# Patient Record
(demographics unavailable — no encounter records)

---

## 2025-04-15 NOTE — PHYSICAL EXAM

## 2025-04-18 NOTE — RISK ASSESSMENT
[No Increased risk of SCA or SCD] : No Increased risk of SCA or SCD    [[id number]] : 2 [Have you ever fainted, passed out or had an unexplained seizure suddenly and without warning, especially during exercise or in response] : Have you ever fainted, passed out or had an unexplained seizure suddenly and without warning, especially during exercise or in response to sudden loud noises such as doorbells, alarm clocks and ringing telephones? No [Have you ever had exercise-related chest pain or shortness of breath?] : Have you ever had exercise-related chest pain or shortness of breath? No [Has anyone in your immediate family (parents, grandparents, siblings) or other more distant relatives (aunts, uncles, cousins)  of heart] : Has anyone in your immediate family (parents, grandparents, siblings) or other more distant relatives (aunts, uncles, cousins)  of heart problems or had an unexpected sudden death before age 50 (This would include unexpected drownings, unexplained car accidents in which the relative was driving or sudden infant death syndrome.)? No [Are you related to anyone with hypertrophic cardiomyopathy or hypertrophic obstructive cardiomyopathy, Marfan syndrome, arrhythmogenic] : Are you related to anyone with hypertrophic cardiomyopathy or hypertrophic obstructive cardiomyopathy, Marfan syndrome, arrhythmogenic right ventricular cardiomyopathy, long QT syndrome, short QT syndrome, Brugada syndrome or catecholaminergic polymorphic ventricular tachycardia, or anyone younger than 50 years with a pacemaker or implantable defibrillator? No

## 2025-04-18 NOTE — DISCUSSION/SUMMARY
[Normal Development] : development  [No Elimination Concerns] : elimination [No Skin Concerns] : skin [Normal Sleep Pattern] : sleep [None] : no medical problems [Physical Growth and Development] : physical growth and development [Social and Academic Competence] : social and academic competence [Emotional Well-Being] : emotional well-being [Risk Reduction] : risk reduction [Violence and Injury Prevention] : violence and injury prevention [Patient] : patient [Mother] : mother [Full Activity without restrictions including Physical Education & Athletics] : Full Activity without restrictions including Physical Education & Athletics [] : The components of the vaccine(s) to be administered today are listed in the plan of care. The disease(s) for which the vaccine(s) are intended to prevent and the risks have been discussed with the caretaker.  The risks are also included in the appropriate vaccination information statements which have been provided to the patient's caregiver.  The caregiver has given consent to vaccinate. [de-identified] : Accelerated weight velocity  [de-identified] : Decrease chips [FreeTextEntry1] : Rocio is a 11yo girl with history of >95%ile for weight that presents for WCC. Since last visit, patient has continued to show accelerated weight gain, having gained about ~30lbs since last visit and has gained about 4 inches in height. In addition, mother expresses concern that patient has had near daily episodes of emotional outbursts at home. Patient had seen a therapist in the past, but patient did not feel comfortable opening up to them about her emotions so has not seen a therapist some time. Had long discussion with Rocio about importance of mental/emotional health in order to promote her overall health, however Rocio is not comfortable at this time speaking to a mental health professional and also feels she does not have time to see anyone   - HPV #2 given to patient today  - Given continued high weight velocity, will resend fasting labs: lipids, CBC, A1c, thyroid studies, AST/ALT  - CLARISSA-7 score 9 and PHQ9 score 2  - Dental list given  - Discussed seeing weight management clinic, mother undecided  - Concern for emotional lability, but patient is unwilling to speak with a mental health specialist. Will message office mental health specialist to have them aware of family in case Rocio changes her mind  RTC in 2-3 months for follow up for weight check and emotional check in

## 2025-04-18 NOTE — RISK ASSESSMENT
[No Increased risk of SCA or SCD] : No Increased risk of SCA or SCD    [[id number]] : 2 [Have you ever fainted, passed out or had an unexplained seizure suddenly and without warning, especially during exercise or in response] : Have you ever fainted, passed out or had an unexplained seizure suddenly and without warning, especially during exercise or in response to sudden loud noises such as doorbells, alarm clocks and ringing telephones? No [Have you ever had exercise-related chest pain or shortness of breath?] : Have you ever had exercise-related chest pain or shortness of breath? No [Has anyone in your immediate family (parents, grandparents, siblings) or other more distant relatives (aunts, uncles, cousins)  of heart] : Has anyone in your immediate family (parents, grandparents, siblings) or other more distant relatives (aunts, uncles, cousins)  of heart problems or had an unexpected sudden death before age 50 (This would include unexpected drownings, unexplained car accidents in which the relative was driving or sudden infant death syndrome.)? No [Are you related to anyone with hypertrophic cardiomyopathy or hypertrophic obstructive cardiomyopathy, Marfan syndrome, arrhythmogenic] : Are you related to anyone with hypertrophic cardiomyopathy or hypertrophic obstructive cardiomyopathy, Marfan syndrome, arrhythmogenic right ventricular cardiomyopathy, long QT syndrome, short QT syndrome, Brugada syndrome or catecholaminergic polymorphic ventricular tachycardia, or anyone younger than 50 years with a pacemaker or implantable defibrillator? No

## 2025-04-18 NOTE — HISTORY OF PRESENT ILLNESS
[Mother] : mother [Tap water] : Primary Fluoride Source: Tap water [Up to date] : Up to date [Normal] : normal [LMP: _____] : LMP: [unfilled] [Days of Bleeding: _____] : Days of bleeding: [unfilled] [Age of Menarche: ____] : Age of Menarche: [unfilled] [Menstrual products used per day: _____] : Menstrual products used per day: [unfilled] [Eats meals with family] : eats meals with family [Has family members/adults to turn to for help] : has family members/adults to turn to for help [Is permitted and is able to make independent decisions] : Is permitted and is able to make independent decisions [Grade: ____] : Grade: [unfilled] [Normal Performance] : normal performance [Normal Behavior/Attention] : normal behavior/attention [Normal Homework] : normal homework [Eats regular meals including adequate fruits and vegetables] : eats regular meals including adequate fruits and vegetables [Drinks non-sweetened liquids] : drinks non-sweetened liquids  [Calcium source] : calcium source [Has concerns about body or appearance] : has concerns about body or appearance [Has friends] : has friends [At least 1 hour of physical activity a day] : at least 1 hour of physical activity a day [Screen time (except homework) less than 2 hours a day] : screen time (except homework) less than 2 hours a day [Has interests/participates in community activities/volunteers] : has interests/participates in community activities/volunteers. [Uses safety belts/safety equipment] : uses safety belts/safety equipment  [Has peer relationships free of violence] : has peer relationships free of violence [No] : Patient has not had sexual intercourse [HIV Screening Declined] : HIV Screening Declined [Displays self-confidence] : displays self-confidence [Gets depressed, anxious, or irritable/has mood swings] : gets depressed, anxious, or irritable/has mood swings [NO] : No [Irregular menses] : no irregular menses [Heavy Bleeding] : no heavy bleeding [Painful Cramps] : no painful cramps [Hirsutism] : no hirsutism [Acne] : no acne [Tampon Use] : no tampon use [Sleep Concerns] : no sleep concerns [Uses electronic nicotine delivery system] : does not use electronic nicotine delivery system [Exposure to electronic nicotine delivery system] : no exposure to electronic nicotine delivery system [Uses tobacco] : does not use tobacco [Exposure to tobacco] : no exposure to tobacco [Uses drugs] : does not use drugs  [Exposure to drugs] : no exposure to drugs [Drinks alcohol] : does not drink alcohol [Exposure to alcohol] : no exposure to alcohol [Impaired/distracted driving] : no impaired/distracted driving [Has ways to cope with stress] : does not have ways to cope with stress [Has problems with sleep] : does not have problems with sleep [Has thought about hurting self or considered suicide] : has not thought about hurting self or considered suicide [FreeTextEntry7] : Has been doing well in school  [de-identified] : Mother concerned about weight velocity. Mother also concerned about emotional lability  [de-identified] : Has not seen a dentist in over a year, needs dental list  [de-identified] : Sleeps from 10:15PM - 5:30AM [de-identified] : Doing well in school, interested in musical theater. Taking accelerated 9th grade level math classes  [de-identified] : Patient eats 3 meals a day, eats fruit and chips for snacks. Some concerns for body issues.  [de-identified] : Has many extracurricular activities including musical theater  [de-identified] : Interested in both men and women [de-identified] : Patient has episodes of emotional lability where she will become very angry with parents over misconstrued comments. Patient feels very remorseful following emotional outbursts. Had previously seen a therapist before, but no longer seeing now. Does not have great methods to cope with stress (states she does more work when feeling stressed).

## 2025-04-18 NOTE — HISTORY OF PRESENT ILLNESS
[Mother] : mother [Tap water] : Primary Fluoride Source: Tap water [Up to date] : Up to date [Normal] : normal [LMP: _____] : LMP: [unfilled] [Days of Bleeding: _____] : Days of bleeding: [unfilled] [Age of Menarche: ____] : Age of Menarche: [unfilled] [Menstrual products used per day: _____] : Menstrual products used per day: [unfilled] [Eats meals with family] : eats meals with family [Has family members/adults to turn to for help] : has family members/adults to turn to for help [Is permitted and is able to make independent decisions] : Is permitted and is able to make independent decisions [Grade: ____] : Grade: [unfilled] [Normal Performance] : normal performance [Normal Behavior/Attention] : normal behavior/attention [Normal Homework] : normal homework [Eats regular meals including adequate fruits and vegetables] : eats regular meals including adequate fruits and vegetables [Drinks non-sweetened liquids] : drinks non-sweetened liquids  [Calcium source] : calcium source [Has concerns about body or appearance] : has concerns about body or appearance [Has friends] : has friends [At least 1 hour of physical activity a day] : at least 1 hour of physical activity a day [Screen time (except homework) less than 2 hours a day] : screen time (except homework) less than 2 hours a day [Has interests/participates in community activities/volunteers] : has interests/participates in community activities/volunteers. [Uses safety belts/safety equipment] : uses safety belts/safety equipment  [Has peer relationships free of violence] : has peer relationships free of violence [No] : Patient has not had sexual intercourse [HIV Screening Declined] : HIV Screening Declined [Displays self-confidence] : displays self-confidence [Gets depressed, anxious, or irritable/has mood swings] : gets depressed, anxious, or irritable/has mood swings [NO] : No [Irregular menses] : no irregular menses [Heavy Bleeding] : no heavy bleeding [Painful Cramps] : no painful cramps [Hirsutism] : no hirsutism [Acne] : no acne [Tampon Use] : no tampon use [Sleep Concerns] : no sleep concerns [Uses electronic nicotine delivery system] : does not use electronic nicotine delivery system [Exposure to electronic nicotine delivery system] : no exposure to electronic nicotine delivery system [Uses tobacco] : does not use tobacco [Exposure to tobacco] : no exposure to tobacco [Uses drugs] : does not use drugs  [Exposure to drugs] : no exposure to drugs [Drinks alcohol] : does not drink alcohol [Exposure to alcohol] : no exposure to alcohol [Impaired/distracted driving] : no impaired/distracted driving [Has ways to cope with stress] : does not have ways to cope with stress [Has problems with sleep] : does not have problems with sleep [Has thought about hurting self or considered suicide] : has not thought about hurting self or considered suicide [FreeTextEntry7] : Has been doing well in school  [de-identified] : Mother concerned about weight velocity. Mother also concerned about emotional lability  [de-identified] : Has not seen a dentist in over a year, needs dental list  [de-identified] : Sleeps from 10:15PM - 5:30AM [de-identified] : Doing well in school, interested in musical theater. Taking accelerated 9th grade level math classes  [de-identified] : Patient eats 3 meals a day, eats fruit and chips for snacks. Some concerns for body issues.  [de-identified] : Has many extracurricular activities including musical theater  [de-identified] : Interested in both men and women [de-identified] : Patient has episodes of emotional lability where she will become very angry with parents over misconstrued comments. Patient feels very remorseful following emotional outbursts. Had previously seen a therapist before, but no longer seeing now. Does not have great methods to cope with stress (states she does more work when feeling stressed).

## 2025-04-18 NOTE — DISCUSSION/SUMMARY
[Normal Development] : development  [No Elimination Concerns] : elimination [No Skin Concerns] : skin [Normal Sleep Pattern] : sleep [None] : no medical problems [Physical Growth and Development] : physical growth and development [Social and Academic Competence] : social and academic competence [Emotional Well-Being] : emotional well-being [Risk Reduction] : risk reduction [Violence and Injury Prevention] : violence and injury prevention [Patient] : patient [Mother] : mother [Full Activity without restrictions including Physical Education & Athletics] : Full Activity without restrictions including Physical Education & Athletics [] : The components of the vaccine(s) to be administered today are listed in the plan of care. The disease(s) for which the vaccine(s) are intended to prevent and the risks have been discussed with the caretaker.  The risks are also included in the appropriate vaccination information statements which have been provided to the patient's caregiver.  The caregiver has given consent to vaccinate. [de-identified] : Accelerated weight velocity  [de-identified] : Decrease chips [FreeTextEntry1] : Rocio is a 11yo girl with history of >95%ile for weight that presents for WCC. Since last visit, patient has continued to show accelerated weight gain, having gained about ~30lbs since last visit and has gained about 4 inches in height. In addition, mother expresses concern that patient has had near daily episodes of emotional outbursts at home. Patient had seen a therapist in the past, but patient did not feel comfortable opening up to them about her emotions so has not seen a therapist some time. Had long discussion with Rocio about importance of mental/emotional health in order to promote her overall health, however Rocio is not comfortable at this time speaking to a mental health professional and also feels she does not have time to see anyone   - HPV #2 given to patient today  - Given continued high weight velocity, will resend fasting labs: lipids, CBC, A1c, thyroid studies, AST/ALT  - CLARISSA-7 score 9 and PHQ9 score 2  - Dental list given  - Discussed seeing weight management clinic, mother undecided  - Concern for emotional lability, but patient is unwilling to speak with a mental health specialist. Will message office mental health specialist to have them aware of family in case Rocio changes her mind  RTC in 2-3 months for follow up for weight check and emotional check in

## 2025-06-05 NOTE — REVIEW OF SYSTEMS
[Rash] : no rash [Congestion] : no congestion [Feeding Problem] : no feeding problem [Joint Swelling] : no joint swelling

## 2025-06-05 NOTE — PHYSICAL EXAM
[FreeTextEntry1] : GAIT: mild limp, prefers to walk on the left toes.  GENERAL: alert, cooperative pleasant young  11 yo female in NAD SKIN: The skin is intact, warm, pink and dry over the area examined. EYES: Normal conjunctiva, normal eyelids and pupils were equal and round. ENT: normal ears, normal nose and normal lips. CARDIOVASCULAR: brisk capillary refill, but no peripheral edema. RESPIRATORY: The patient is in no apparent respiratory distress. They're taking full deep breaths without use of accessory muscles or evidence of audible wheezes or stridor without the use of a stethoscope. Normal respiratory effort. ABDOMEN: not examined   LLE no sts noted or ecchymosis. Mild tenderness over the anterior ankle joint to palpation. No fibular or medial ankle tenderness. No proximal tenderness. No foot tendereness. Full ankle and subtalar ROM No instability to stress.  Distal motor 5/5 sensation grossly intact brisk cap refill

## 2025-06-05 NOTE — DATA REVIEWED
[de-identified] : 3 views of the ankle left in the office today reveal no evidence of fracture or dislocation. Skeletally immature. There is no joint effusion. There is fragmentation of the calcaneal apophysis noted. Mortise intact.

## 2025-06-05 NOTE — HISTORY OF PRESENT ILLNESS
[FreeTextEntry1] : 11 yo female presents with mother for evaluation of left ankle injury. The patient states 5 days prior she fell and landed on her left foot. She describes the ankle being plantarflexed when body weight landed on her. She has been having pain and difficulty walking. She went away to school trip to [place] and when came back went to Urgent care. Xrays initially negative but then mother got a call back from [facility] SHe has been using an ankle wrap with some relief. No meds used. SHe has been icing the area. She is here for the first time for evaluation. No prior injury to this ankle. No swelling reported. No instability reported.

## 2025-06-05 NOTE — ASSESSMENT
[FreeTextEntry1] : Left ankle injury  The history for today's visit was obtained from the child, as well as the parent. The child's history was unreliable alone due to age and therefore, the parent was used today as an independent historian. 3 views of the ankle left in the office today reveal no evidence of fracture or dislocation. Skeletally immature. There is no joint effusion. There is fragmentation of the calcaneal apophysis noted. Mortise intact.  The xrays and clincai exam were discussed with mother and patient. She appears to have a dorsal ankle sprain vs contusion given the mechanism of injury. SHe is doing well today. SHe can continue WBAT on the LLE and will use the compression wrap for comfort. SHe can continue Icing and NSAIDS as needed. No gym or sports for approx 2 weeks. Once she is feeling better she can resume activity. No formal PT recommended at this time. She will f/u on a PRN basis  All questions answered. Parent in agreement with the plan. ITianna, BEAR, PAC, have acted as a scribe and documented the above for Dr. Thomas.  The above documentation completed by the scribe is an accurate record of both my words and actions.  MELY

## 2025-07-30 NOTE — HISTORY OF PRESENT ILLNESS
[FreeTextEntry6] : Rocio is a 12-year-old F coming in for follow-up visit for concerns about emotional lability and anger outbursts:   On last visit, Rocio and parent were concerned about Rocio's emotional lability and anger outbursts.  Rocio admitted to not having good coping mechanisms but was not open to talking to therapist at the time.  Since last visit, both Rocio and mother feel that symptoms have not improved.  Currently have construction going on at home which has increased stress overall.  Rocio is currently on summer break and just completed a summer theater program that she enjoyed.  Both feel that still is having anger outbursts and can get easily upset.  Rocio is open to trying to talk to therapist.

## 2025-07-30 NOTE — DISCUSSION/SUMMARY
[FreeTextEntry1] : Rocio is a 12-year-old F coming in for follow-up visit for anger outbursts and emotional lability. MOC and Rocio agree that symptoms have not improved since last visit. Rocio recently attended summer program that she really enjoyed. But despite being off from school, current stress from home construction has still been causing Rocio to feel stress. Rocio and parent are interested in her talking to therapist. Will refer to Letty Ibarra.

## 2025-07-30 NOTE — PHYSICAL EXAM
[NL] : no acute distress, alert [Moves All Extremities x 4] : moves all extremities x4 [de-identified] : MMM [FreeTextEntry7] : Breathing comfortably on room air